# Patient Record
Sex: FEMALE | Race: AMERICAN INDIAN OR ALASKA NATIVE
[De-identification: names, ages, dates, MRNs, and addresses within clinical notes are randomized per-mention and may not be internally consistent; named-entity substitution may affect disease eponyms.]

---

## 2017-05-27 ENCOUNTER — HOSPITAL ENCOUNTER (EMERGENCY)
Dept: HOSPITAL 31 - C.ER | Age: 37
Discharge: HOME | End: 2017-05-27
Payer: COMMERCIAL

## 2017-05-27 VITALS — SYSTOLIC BLOOD PRESSURE: 139 MMHG | DIASTOLIC BLOOD PRESSURE: 79 MMHG

## 2017-05-27 VITALS — HEART RATE: 87 BPM

## 2017-05-27 VITALS — RESPIRATION RATE: 18 BRPM | TEMPERATURE: 98.7 F | OXYGEN SATURATION: 100 %

## 2017-05-27 VITALS — BODY MASS INDEX: 38.7 KG/M2

## 2017-05-27 DIAGNOSIS — M54.9: Primary | ICD-10-CM

## 2017-05-27 LAB
BILIRUB UR-MCNC: NEGATIVE MG/DL
GLUCOSE UR STRIP-MCNC: NORMAL MG/DL
KETONES UR STRIP-MCNC: NEGATIVE MG/DL
LEUKOCYTE ESTERASE UR-ACNC: (no result) LEU/UL
PH UR STRIP: 6 [PH] (ref 5–8)
PROT UR STRIP-MCNC: NEGATIVE MG/DL
RBC # UR STRIP: (no result) /UL
RBC #/AREA URNS HPF: 5 /HPF (ref 0–3)
SP GR UR STRIP: 1.01 (ref 1–1.03)
UROBILINOGEN UR-MCNC: NORMAL MG/DL (ref 0.2–1)
WBC #/AREA URNS HPF: 1 /HPF (ref 0–5)

## 2017-05-27 PROCEDURE — 99285 EMERGENCY DEPT VISIT HI MDM: CPT

## 2017-05-27 PROCEDURE — 87086 URINE CULTURE/COLONY COUNT: CPT

## 2017-05-27 PROCEDURE — 74176 CT ABD & PELVIS W/O CONTRAST: CPT

## 2017-05-27 PROCEDURE — 81001 URINALYSIS AUTO W/SCOPE: CPT

## 2017-05-27 PROCEDURE — 84703 CHORIONIC GONADOTROPIN ASSAY: CPT

## 2017-05-27 PROCEDURE — 96374 THER/PROPH/DIAG INJ IV PUSH: CPT

## 2017-05-27 NOTE — C.PDOC
History Of Present Illness


Patient is a 37 y/o female that presents to the ED for evaluation of constant 

left lower back pain for the past 8 days. Pain is described as a deep burning 

sensation. Pt reports being seen my PMD in the office today, and was told there 

was blood in the urine, and was sent to the ED for possible kidney stone. Pt 

states pain began to radiate to the right side this morning. Pt notes taking 

Flexeril and OTC Ibuprofen with no relief of symptoms. Pt reports 1 episode of 

vomiting 7 days ago and 2 episodes of vomiting yesterday. Otherwise, denies any 

dysuria, abdominal pain, nausea, fever, chills, or any other associated 

symptoms at this time. Denies any surgical history. 





Time Seen by Provider: 05/27/17 10:51


Chief Complaint (Nursing): Back Pain


History Per: Patient


History/Exam Limitations: no limitations


Onset/Duration Of Symptoms: Days (8), Persistent


Current Symptoms Are (Timing): Still Present


Quality Of Discomfort: Burning, "Pain"


Previous Symptoms: denies: Neck Pain, Prior Injury, Prior Surgery


Associated Symptoms: None.  denies: Incontinence, New Weakness, New Numbness


Exacerbating Factor(s): Nothing


Recent travel outside of the [place]: No


Additional History Per: Patient





Past Medical History


Reviewed: Historical Data, Nursing Documentation, Vital Signs


Vital Signs: 


 Last Vital Signs











Temp  98.7 F   05/27/17 10:43


 


Pulse  87   05/27/17 13:31


 


Resp  18   05/27/17 13:31


 


BP  131/74   05/27/17 13:31


 


Pulse Ox  100   05/27/17 14:10














- Medical History


PMH: Back Problems, Bronchitis


Family History: States: No Known Family Hx





- Social History


Hx Alcohol Use: No


Hx Substance Use: No





- Immunization History


Hx Tetanus Toxoid Vaccination: No


Hx Influenza Vaccination: No


Hx Pneumococcal Vaccination: No





Review Of Systems


Except As Marked, All Systems Reviewed And Found Negative.


Constitutional: Negative for: Fever, Chills, Sweats, Weakness, Malaise, Weight 

loss


Gastrointestinal: Positive for: Vomiting.  Negative for: Nausea, Abdominal Pain

, Diarrhea, Constipation


Genitourinary: Negative for: Dysuria, Frequency, Incontinence


Musculoskeletal: Positive for: Back Pain


Skin: Negative for: Rash


Neurological: Negative for: Weakness, Numbness





Physical Exam





- Physical Exam


Appears: Non-toxic, No Acute Distress


Skin: Normal Color, Warm, Dry


Head: Atraumatic, Normacephalic


Neck: Normal ROM, Supple


Cardiovascular: Rhythm Regular, No Murmur


Respiratory: Normal Breath Sounds, No Decreased Breath Sounds, No Accessory 

Muscle Use, No Rales, No Rhonchi, No Wheezing


Gastrointestinal/Abdominal: Soft, No Tenderness, No Distention, No Guarding, No 

Rebound


Back: No Vertebral Tenderness, Paraspinal Tenderness (left lower throracic 

paraspinus muscle tenderness)


Extremity: No Swelling


Neurological/Psych: Oriented x3, Normal Motor, Normal Sensation, Other (no 

focal deficits)


Gait: Steady





ED Course And Treatment


O2 Sat by Pulse Oximetry: 100 (on RA)


Pulse Ox Interpretation: Normal


Progress Note: Labs ordered and reviewed. Patient was given IV fluids, and 

Toradol in the ER.





Medical Decision Making


Medical Decision Making: 





disc results w the pt, plan for rx, f/u, rtr





Abd & pelvis CT


FINDINGS:





LOWER THORAX:


Unremarkable. 





LIVER:


Unremarkable. No gross lesion or ductal dilatation.  





GALLBLADDER AND BILE DUCTS:


Unremarkable. 





PANCREAS:


Unremarkable. No gross lesion or ductal dilatation.





SPLEEN:


Unremarkable. 





ADRENALS:


Unremarkable. No mass. 





KIDNEYS AND URETERS:


Unremarkable. No hydronephrosis. No solid mass. 





VASCULATURE:


Unremarkable. No aortic aneurysm. 





BOWEL:


Unremarkable. No obstruction. No gross mural thickening. Constipation without 

fecal impaction or obstruction. 





APPENDIX:


Unremarkable. Normal appendix. 





PERITONEUM:


Unremarkable. No free fluid. No free air. 





LYMPH NODES:


Unremarkable. No enlarged lymph nodes. 





BLADDER:


Unremarkable. 





REPRODUCTIVE:


Unremarkable. 





BONES:


No acute fracture. 





OTHER FINDINGS:


None.





IMPRESSION:


Negative study for calculus disease, hydronephrosis, hydroureter or urinary 

bladder abnormality.





Additional benign and/or incidental findings described above.











Disposition





- Disposition


Disposition: HOME/ ROUTINE


Disposition Time: 14:08


Condition: STABLE


Additional Instructions: 


Please follow up with your primary doctor in the next week. Return to the ER 

for any worsening symptoms or for any other concerns. 


Prescriptions: 


Lidocaine 5% [Lidoderm] 1 ea TD DAILY PRN #10 patch


 PRN Reason: back pain


Naproxen [Naprosyn] 500 mg PO Q12H PRN #10 tablet


 PRN Reason: Pain, Moderate (4-7)


Instructions:  Back Pain (ED), Back Exercises (ED)


Forms:  General Discharge Instructions





- Clinical Impression


Clinical Impression: 


 Back pain








- Scribe Statement


The provider has reviewed the documentation as recorded by the Leidaibok Lima





Provider Attestation: 








All medical record entries made by the Leidaibok were at my direction and 

personally dictated by me. I have reviewed the chart and agree that the record 

accurately reflects my personal performance of the history, physical exam, 

medical decision making, and the department course for this patient. I have 

also personally directed, reviewed, and agree with the discharge instructions 

and disposition.

## 2017-05-27 NOTE — CT
PROCEDURE:  CT Abdomen and Pelvis without intravenous contrast. By 

history, negative pregnancy test (concurrent with this examination).



HISTORY:

r flank pain r/o ureteral stone



COMPARISON:

None.



TECHNIQUE:

Unenhanced study. Neither oral nor intravenous contrast administered.



Radiation dose:



Total exam DLP = 1096.77 mGy-cm.



This CT exam was performed using one or more of the following dose 

reduction techniques: Automated exposure control, adjustment of the 

mA and/or kV according to patient size, and/or use of iterative 

reconstruction technique.



FINDINGS:



LOWER THORAX:

Unremarkable. 



LIVER:

Unremarkable. No gross lesion or ductal dilatation.  



GALLBLADDER AND BILE DUCTS:

Unremarkable. 



PANCREAS:

Unremarkable. No gross lesion or ductal dilatation.



SPLEEN:

Unremarkable. 



ADRENALS:

Unremarkable. No mass. 



KIDNEYS AND URETERS:

Unremarkable. No hydronephrosis. No solid mass. 



VASCULATURE:

Unremarkable. No aortic aneurysm. 



BOWEL:

Unremarkable. No obstruction. No gross mural thickening. Constipation 

without fecal impaction or obstruction. 



APPENDIX:

Unremarkable. Normal appendix. 



PERITONEUM:

Unremarkable. No free fluid. No free air. 



LYMPH NODES:

Unremarkable. No enlarged lymph nodes. 



BLADDER:

Unremarkable. 



REPRODUCTIVE:

Unremarkable. 



BONES:

No acute fracture. 



OTHER FINDINGS:

None.



IMPRESSION:

Negative study for calculus disease, hydronephrosis, hydroureter or 

urinary bladder abnormality.



Additional benign and/or incidental findings described above.

## 2018-06-27 ENCOUNTER — HOSPITAL ENCOUNTER (EMERGENCY)
Dept: HOSPITAL 31 - C.ER | Age: 38
Discharge: HOME | End: 2018-06-27
Payer: COMMERCIAL

## 2018-06-27 VITALS — BODY MASS INDEX: 38.7 KG/M2

## 2018-06-27 VITALS
RESPIRATION RATE: 24 BRPM | DIASTOLIC BLOOD PRESSURE: 80 MMHG | OXYGEN SATURATION: 100 % | TEMPERATURE: 98.1 F | HEART RATE: 90 BPM | SYSTOLIC BLOOD PRESSURE: 122 MMHG

## 2018-06-27 DIAGNOSIS — K08.89: Primary | ICD-10-CM

## 2018-06-27 PROCEDURE — 99283 EMERGENCY DEPT VISIT LOW MDM: CPT

## 2018-06-27 PROCEDURE — 96372 THER/PROPH/DIAG INJ SC/IM: CPT

## 2018-06-27 NOTE — C.PDOC
History Of Present Illness


37 year old female presents to the ER with a complaint of dental pain. Patient 

had a root canal done 6 days ago and temp crown placed yesterday. She states 

that when the novacaine wore off she began having pain, she took naproxen at 17:

00 with no improvement. Patient has a prescription for antibiotics which she 

will be starting tomorrow. Denies fever, difficulty breathing, or difficulty 

swallowing.


Time Seen by Provider: 06/27/18 03:06


Chief Complaint (Nursing): Dental Pain


History Per: Patient


History/Exam Limitations: no limitations


Onset/Duration Of Symptoms: Hrs


Current Symptoms Are (Timing): Still Present


Recent travel outside of the [place]: No





Past Medical History


Reviewed: Historical Data, Nursing Documentation, Vital Signs


Vital Signs: 


 Last Vital Signs











Temp  98.1 F   06/27/18 03:03


 


Pulse  90   06/27/18 03:03


 


Resp  24   06/27/18 03:03


 


BP  122/80   06/27/18 03:03


 


Pulse Ox  100   06/27/18 03:36














- Medical History


PMH: Back Problems, Bronchitis


Family History: States: Unknown Family Hx





- Social History


Hx Alcohol Use: No


Hx Substance Use: No





- Immunization History


Hx Tetanus Toxoid Vaccination: No


Hx Influenza Vaccination: No


Hx Pneumococcal Vaccination: No





Review Of Systems


Constitutional: Negative for: Fever, Chills


ENT: Positive for: Mouth Pain.  Negative for: Throat Swelling


Respiratory: Negative for: Shortness of Breath, Wheezing





Physical Exam





- Physical Exam


Appears: Well, Non-toxic, No Acute Distress


Skin: Normal Color, Warm, Dry


Head: Atraumatic, Normacephalic


Eye(s): bilateral: Normal Inspection, EOMI


Nose: Normal


Oral Mucosa: Moist


Tongue: Normal Appearing, No Swelling


Lips: Normal Appearing, No Swelling


Teeth: Other (Diffuse tenderness to left maxillary premolar)


Gingiva: Normal Appearing, No Swelling


Throat: Normal, No Erythema


Neck: Normal, Normal ROM, Supple


Chest: Symmetrical


Cardiovascular: Rhythm Regular


Respiratory: Normal Breath Sounds, No Accessory Muscle Use


Extremity: Normal ROM


Neurological/Psych: Oriented x3, Normal Speech





ED Course And Treatment


O2 Sat by Pulse Oximetry: 100 (Room air)


Pulse Ox Interpretation: Normal


Progress Note: Percocet, toradol, and viscous lidocaine administered. On 

reevaluation, patient reports improvement of her dental pain, she is resting 

comfortably in no distress, afebrile, will discharge home with Rx and 

instructions to follow up with dentist tomorrow.





Disposition





- Disposition


Disposition: HOME/ ROUTINE


Disposition Time: 03:20


Condition: STABLE


Additional Instructions: 


Follow up with the dentist tomorrow. Return to ER if symptoms persist or 

worsen. 


Prescriptions: 


traMADol [Ultram] 50 mg PO Q8 #10 tab


Instructions:  Dental Pain (DC)


Forms:  [facility] (English)





- Clinical Impression


Clinical Impression: 


 Pain, dental








- PA / NP / Resident Statement


MD/DO has reviewed & agrees with the documentation as recorded.





- Scribe Statement


The provider has reviewed the documentation as recorded by the Scribok Perez





All medical record entries made by the Stephanie were at my direction and 

personally dictated by me. I have reviewed the chart and agree that the record 

accurately reflects my personal performance of the history, physical exam, 

medical decision making, and the department course for this patient. I have 

also personally directed, reviewed, and agree with the discharge instructions 

and disposition.